# Patient Record
Sex: MALE
[De-identification: names, ages, dates, MRNs, and addresses within clinical notes are randomized per-mention and may not be internally consistent; named-entity substitution may affect disease eponyms.]

---

## 2024-04-29 PROBLEM — Z00.00 ENCOUNTER FOR PREVENTIVE HEALTH EXAMINATION: Status: ACTIVE | Noted: 2024-04-29

## 2024-05-07 ENCOUNTER — NON-APPOINTMENT (OUTPATIENT)
Age: 24
End: 2024-05-07

## 2024-05-07 ENCOUNTER — APPOINTMENT (OUTPATIENT)
Dept: OTOLARYNGOLOGY | Facility: CLINIC | Age: 24
End: 2024-05-07
Payer: COMMERCIAL

## 2024-05-07 VITALS
TEMPERATURE: 98.6 F | DIASTOLIC BLOOD PRESSURE: 84 MMHG | OXYGEN SATURATION: 97 % | WEIGHT: 154 LBS | HEART RATE: 67 BPM | SYSTOLIC BLOOD PRESSURE: 131 MMHG | BODY MASS INDEX: 22.81 KG/M2 | HEIGHT: 69 IN

## 2024-05-07 DIAGNOSIS — J35.8 OTHER CHRONIC DISEASES OF TONSILS AND ADENOIDS: ICD-10-CM

## 2024-05-07 DIAGNOSIS — R19.6 HALITOSIS: ICD-10-CM

## 2024-05-07 DIAGNOSIS — Z78.9 OTHER SPECIFIED HEALTH STATUS: ICD-10-CM

## 2024-05-07 PROCEDURE — 99203 OFFICE O/P NEW LOW 30 MIN: CPT

## 2024-05-08 NOTE — ASSESSMENT
[FreeTextEntry1] : - 5/7/24: 25 y/o M presents with recurrent tonsil stones for the past 5 years. He notices them once every 3 days.  He also endorses halitosis. On physical exam he was found to have 2+ cryptic tonsils bilaterally. For tonsil stones I recommended using a Waterpik to remove them moving forward. Follow up in 3 months. If stones persist consider tonsillectomy in the future, given information for ENTs who do this procedure.  -Waterpik to remove tonsil stones -Followup in 3 months

## 2024-05-08 NOTE — PHYSICAL EXAM
[Normal] : mucosa is normal [Midline] : trachea located in midline position [de-identified] : 2+, cryptic

## 2024-05-08 NOTE — HISTORY OF PRESENT ILLNESS
[de-identified] : 5/7/24: 25 y/o M presents with recurrent tonsil stones for the past 5 years. He notices them once every 3 days. He also endorses halitosis. He has good dental hygiene and goes for cleanings twice a year. To remove them he gargles with salt water or he uses a qtip. He denies recurrent strep infections. No issues eating, chewing, or swallowing. Drinks at least 6 glasses of water per day.